# Patient Record
Sex: MALE | URBAN - METROPOLITAN AREA
[De-identification: names, ages, dates, MRNs, and addresses within clinical notes are randomized per-mention and may not be internally consistent; named-entity substitution may affect disease eponyms.]

---

## 2019-10-21 ENCOUNTER — OFFICE VISIT (OUTPATIENT)
Dept: URBAN - METROPOLITAN AREA CLINIC 40 | Facility: CLINIC | Age: 64
End: 2019-10-21
Payer: COMMERCIAL

## 2019-10-21 DIAGNOSIS — H04.123 DRY EYE SYNDROME OF BILATERAL LACRIMAL GLANDS: Primary | ICD-10-CM

## 2019-10-21 PROCEDURE — 92012 INTRM OPH EXAM EST PATIENT: CPT | Performed by: OPTOMETRIST

## 2019-10-21 ASSESSMENT — KERATOMETRY
OS: 43.75
OD: 43.50

## 2019-10-21 ASSESSMENT — INTRAOCULAR PRESSURE
OD: 14
OS: 13

## 2019-10-21 NOTE — IMPRESSION/PLAN
Impression: Dry eye syndrome of bilateral lacrimal glands: H04.123. Plan: Dry eyes account for the patient's complaints. There is no evidence of permanent changes to the cornea. Explained condition does not have a cure and will need artificial tears for maintenance. Schedule refraction.

## 2020-07-07 ENCOUNTER — OFFICE VISIT (OUTPATIENT)
Dept: URBAN - METROPOLITAN AREA CLINIC 23 | Facility: CLINIC | Age: 65
End: 2020-07-07
Payer: COMMERCIAL

## 2020-07-07 PROCEDURE — 99213 OFFICE O/P EST LOW 20 MIN: CPT | Performed by: OPTOMETRIST

## 2020-07-07 RX ORDER — TOBRAMYCIN AND DEXAMETHASONE 3; 1 MG/ML; MG/ML
SUSPENSION/ DROPS OPHTHALMIC
Qty: 1 | Refills: 1 | Status: INACTIVE
Start: 2020-07-07 | End: 2020-07-23

## 2020-07-07 NOTE — IMPRESSION/PLAN
Impression: Other mucopurulent conjunctivitis, left eye: H10.022. Plan: Discussed diagnosis in detail with patient. Reassured patient of current condition and treatment. Will continue to observe condition and or symptoms. Patient instructed to call if condition gets worse. New medication(s) Rx given today. Tobra/Dex 1gtt  QID OS  x 1 week.

## 2020-07-14 ENCOUNTER — OFFICE VISIT (OUTPATIENT)
Dept: URBAN - METROPOLITAN AREA CLINIC 23 | Facility: CLINIC | Age: 65
End: 2020-07-14
Payer: COMMERCIAL

## 2020-07-14 DIAGNOSIS — H10.022 OTHER MUCOPURULENT CONJUNCTIVITIS, LEFT EYE: Primary | ICD-10-CM

## 2020-07-14 PROCEDURE — 99213 OFFICE O/P EST LOW 20 MIN: CPT | Performed by: OPTOMETRIST

## 2020-07-14 ASSESSMENT — KERATOMETRY
OD: 43.63
OS: 43.75

## 2020-07-14 ASSESSMENT — INTRAOCULAR PRESSURE
OD: 11
OS: 13

## 2020-07-14 NOTE — IMPRESSION/PLAN
Impression: Other mucopurulent conjunctivitis, left eye: H10.022. Plan: Discussed diagnosis in detail with patient. Reassured patient of current condition and treatment. No change to current treatment. Patient instructed to call if condition gets worse. Continue using current medication(s) as directed then d/c.

## 2020-07-23 ENCOUNTER — OFFICE VISIT (OUTPATIENT)
Dept: URBAN - METROPOLITAN AREA CLINIC 40 | Facility: CLINIC | Age: 65
End: 2020-07-23
Payer: COMMERCIAL

## 2020-07-23 DIAGNOSIS — H52.4 PRESBYOPIA: Primary | ICD-10-CM

## 2020-07-23 PROCEDURE — 92014 COMPRE OPH EXAM EST PT 1/>: CPT | Performed by: OPTOMETRIST

## 2020-07-23 ASSESSMENT — VISUAL ACUITY
OS: 20/20
OD: 20/20

## 2020-07-23 ASSESSMENT — KERATOMETRY
OD: 43.63
OS: 44.00

## 2020-08-24 ENCOUNTER — OFFICE VISIT (OUTPATIENT)
Dept: URBAN - METROPOLITAN AREA CLINIC 40 | Facility: CLINIC | Age: 65
End: 2020-08-24
Payer: COMMERCIAL

## 2020-08-24 DIAGNOSIS — E11.9 TYPE 2 DIABETES MELLITUS W/O COMPLICATION: Primary | ICD-10-CM

## 2020-08-24 PROCEDURE — 92014 COMPRE OPH EXAM EST PT 1/>: CPT | Performed by: OPTOMETRIST

## 2020-08-24 ASSESSMENT — INTRAOCULAR PRESSURE
OD: 16
OS: 14

## 2020-08-24 ASSESSMENT — VISUAL ACUITY
OD: 20/20
OS: 20/20

## 2020-08-24 NOTE — IMPRESSION/PLAN
Impression: Type 2 diabetes mellitus w/o complication: V50.1. Plan: Diabetes type II: no background retinopathy, no signs of neovascularization or macular edema noted. Discussed ocular and systemic benefits of blood sugar control. Schedule refraction.

## 2021-11-05 ENCOUNTER — OFFICE VISIT (OUTPATIENT)
Dept: URBAN - METROPOLITAN AREA CLINIC 40 | Facility: CLINIC | Age: 66
End: 2021-11-05
Payer: COMMERCIAL

## 2021-11-05 DIAGNOSIS — H25.13 AGE-RELATED NUCLEAR CATARACT, BILATERAL: ICD-10-CM

## 2021-11-05 PROCEDURE — 99214 OFFICE O/P EST MOD 30 MIN: CPT | Performed by: OPTOMETRIST

## 2021-11-05 ASSESSMENT — KERATOMETRY
OD: 43.63
OS: 43.88

## 2021-11-05 ASSESSMENT — INTRAOCULAR PRESSURE
OD: 15
OS: 15

## 2021-11-05 NOTE — IMPRESSION/PLAN
Impression: Type 2 diabetes mellitus without complications: X64.0. Plan: Diabetes type II: no background retinopathy, no signs of neovascularization or macular edema noted. Discussed ocular and systemic benefits of blood sugar control. Schedule refraction.

## 2022-12-14 ENCOUNTER — OFFICE VISIT (OUTPATIENT)
Dept: URBAN - METROPOLITAN AREA CLINIC 40 | Facility: CLINIC | Age: 67
End: 2022-12-14
Payer: COMMERCIAL

## 2022-12-14 DIAGNOSIS — E11.9 TYPE 2 DIABETES MELLITUS WITHOUT COMPLICATIONS: Primary | ICD-10-CM

## 2022-12-14 DIAGNOSIS — H25.813 COMBINED FORMS OF AGE-RELATED CATARACT, BILATERAL: ICD-10-CM

## 2022-12-14 PROCEDURE — 99214 OFFICE O/P EST MOD 30 MIN: CPT | Performed by: OPTOMETRIST

## 2022-12-14 ASSESSMENT — INTRAOCULAR PRESSURE
OS: 14
OD: 15

## 2022-12-14 ASSESSMENT — KERATOMETRY
OS: 43.75
OD: 43.75

## 2022-12-14 NOTE — IMPRESSION/PLAN
Impression: Type 2 diabetes mellitus without complications: K60.9. Plan: Diabetes type II: no background retinopathy, no signs of neovascularization or macular edema noted. Discussed ocular and systemic benefits of blood sugar control. Schedule refraction. Sent in 1 bottle PF for him to use when iritis flares up.

## 2024-01-17 ENCOUNTER — OFFICE VISIT (OUTPATIENT)
Dept: URBAN - METROPOLITAN AREA CLINIC 40 | Facility: CLINIC | Age: 69
End: 2024-01-17
Payer: COMMERCIAL

## 2024-01-17 DIAGNOSIS — H25.813 COMBINED FORMS OF AGE-RELATED CATARACT, BILATERAL: ICD-10-CM

## 2024-01-17 DIAGNOSIS — E11.9 TYPE 2 DIABETES MELLITUS W/O COMPLICATION: Primary | ICD-10-CM

## 2024-01-17 PROCEDURE — 99214 OFFICE O/P EST MOD 30 MIN: CPT | Performed by: OPTOMETRIST

## 2024-01-17 ASSESSMENT — KERATOMETRY
OD: 43.50
OS: 43.75

## 2024-01-17 ASSESSMENT — INTRAOCULAR PRESSURE
OS: 14
OD: 14

## 2024-02-06 ENCOUNTER — OFFICE VISIT (OUTPATIENT)
Dept: URBAN - METROPOLITAN AREA CLINIC 40 | Facility: CLINIC | Age: 69
End: 2024-02-06
Payer: COMMERCIAL

## 2024-02-06 PROCEDURE — 99203 OFFICE O/P NEW LOW 30 MIN: CPT | Performed by: OPHTHALMOLOGY

## 2024-02-06 ASSESSMENT — VISUAL ACUITY
OD: 20/20
OS: 20/20

## 2024-02-06 ASSESSMENT — INTRAOCULAR PRESSURE
OD: 16
OS: 16

## 2024-11-05 ENCOUNTER — OFFICE VISIT (OUTPATIENT)
Dept: URBAN - METROPOLITAN AREA CLINIC 40 | Facility: CLINIC | Age: 69
End: 2024-11-05
Payer: COMMERCIAL

## 2024-11-05 DIAGNOSIS — H25.812 COMBINED FORMS OF AGE-RELATED CATARACT, LEFT EYE: Primary | ICD-10-CM

## 2024-11-05 DIAGNOSIS — H25.12 AGE-RELATED NUCLEAR CATARACT, LEFT EYE: ICD-10-CM

## 2024-11-05 PROCEDURE — 99214 OFFICE O/P EST MOD 30 MIN: CPT | Performed by: OPHTHALMOLOGY

## 2024-11-05 ASSESSMENT — KERATOMETRY
OD: 43.50
OS: 43.63

## 2024-11-05 ASSESSMENT — INTRAOCULAR PRESSURE
OD: 14
OS: 14

## 2024-11-05 ASSESSMENT — VISUAL ACUITY
OD: 20/20
OS: 20/30

## 2024-12-23 ENCOUNTER — TECH ONLY (OUTPATIENT)
Dept: URBAN - METROPOLITAN AREA CLINIC 40 | Facility: CLINIC | Age: 69
End: 2024-12-23
Payer: COMMERCIAL

## 2024-12-23 DIAGNOSIS — H25.812 COMBINED FORMS OF AGE-RELATED CATARACT, LEFT EYE: Primary | ICD-10-CM

## 2024-12-23 ASSESSMENT — PACHYMETRY
OS: 3.99
OS: 25.11
OD: 25.23
OD: 3.95